# Patient Record
Sex: FEMALE | Race: WHITE | NOT HISPANIC OR LATINO | Employment: FULL TIME | URBAN - METROPOLITAN AREA
[De-identification: names, ages, dates, MRNs, and addresses within clinical notes are randomized per-mention and may not be internally consistent; named-entity substitution may affect disease eponyms.]

---

## 2022-02-02 ENCOUNTER — OFFICE VISIT (OUTPATIENT)
Dept: OBSTETRICS AND GYNECOLOGY | Facility: CLINIC | Age: 52
End: 2022-02-02

## 2022-02-02 VITALS
SYSTOLIC BLOOD PRESSURE: 130 MMHG | DIASTOLIC BLOOD PRESSURE: 82 MMHG | HEIGHT: 65 IN | WEIGHT: 169 LBS | BODY MASS INDEX: 28.16 KG/M2

## 2022-02-02 DIAGNOSIS — R93.89 THICKENED ENDOMETRIUM: Primary | ICD-10-CM

## 2022-02-02 PROCEDURE — 99203 OFFICE O/P NEW LOW 30 MIN: CPT | Performed by: OBSTETRICS & GYNECOLOGY

## 2022-02-02 PROCEDURE — 58100 BIOPSY OF UTERUS LINING: CPT | Performed by: OBSTETRICS & GYNECOLOGY

## 2022-02-02 RX ORDER — TRAZODONE HYDROCHLORIDE 50 MG/1
TABLET ORAL
COMMUNITY
Start: 2021-12-29

## 2022-02-02 RX ORDER — BUPROPION HYDROCHLORIDE 150 MG/1
150 TABLET ORAL DAILY
COMMUNITY
Start: 2021-12-29

## 2022-02-02 RX ORDER — SIMVASTATIN 10 MG
TABLET ORAL
COMMUNITY
Start: 2021-12-30

## 2022-02-02 NOTE — PROGRESS NOTES
"Chief Complaint   Patient presents with   • Vaginal Pain     NGYN; thickened EMC       Subjective   HPI  Ella Andrade is a 51 y.o. female, , who presents for evaluation of thickened EMC.   She was evaluated at PCP's office at  and an ultrasound revealed EMC on 22 of 1.3cm.      Her LMP was >1yr ago.  FSH on  was 60.7.  She denies vaginal bleeding but c/o a \"constant vaginal pressure.\"  She denies urinary incontinence, dysuria, and frequency.      Partner Status: Marital Status: single. She is not currently sexually active.      Additional OB/GYN History   Last Pap : approx. 2 yrs ago and was wnl per pt   Last Completed Pap Smear     This patient has no relevant Health Maintenance data.        History of abnormal Pap smear: no  Last mammogram: approx. 2 yrs ago and was wnl. Per pt   Last Completed Mammogram     This patient has no relevant Health Maintenance data.          Colonoscopy was nml in  at .    She has never had a BDS.             Tobacco Usage?: No   OB History        3    Para   3    Term   3            AB        Living   3       SAB        IAB        Ectopic        Molar        Multiple        Live Births   3                Health Maintenance   Topic Date Due   • Annual Gynecologic Pelvic and Breast Exam  Never done   • MAMMOGRAM  Never done   • ANNUAL PHYSICAL  Never done   • COVID-19 Vaccine (1) Never done   • ZOSTER VACCINE (1 of 2) Never done   • INFLUENZA VACCINE  2021   • HEPATITIS C SCREENING  Never done   • PAP SMEAR  Never done   • LIPID PANEL  2022   • TDAP/TD VACCINES (2 - Td or Tdap) 2028   • COLORECTAL CANCER SCREENING  2031   • Pneumococcal Vaccine 0-64  Aged Out       The additional following portions of the patient's history were reviewed and updated as appropriate: allergies, current medications, past family history, past medical history, past social history, past surgical history and problem list.    Endometrial " Biopsy    Ella Andrade is a 51 y.o. female,   , whose last menstrual period was No LMP recorded..  The patient has a history of thickened EMC on ultrasound and presents for an endometrial biopsy.  Patient denies vaginal bleeding. .  After the indications, risks, benefits, and alternatives to performing and endometrial biopsy were explained to the patient, and signed consent obtained.  A urine pregnancy test was not indicated.    PROCEDURE:  The patient was placed on the table in the supine lithotomy position.  She was draped in the appropriate manner.  A speculum was placed in the vagina.  The cervix was visualized and prepped with Betadine. A tenaculum was placed. A small plastic 5 mm Pipelle syringe curette was inserted into the cervical canal.  The uterus was sounded to 7 cms.  A vigorous four quadrant biopsy was performed, removing a small amount of tissue.  This tissue was placed in Formalin and sent to pathology.  The patient tolerated the procedure well and reported No cramping.  She had No cramping at the time of discharge.  Physical Exam    Procedures    Review of Systems      Plan:  Orders Placed This Encounter   Procedures   • Mammo Screening Digital Tomosynthesis Bilateral With CAD     Standing Status:   Future     Standing Expiration Date:   2023     Order Specific Question:   Reason for Exam:     Answer:   screen       Problem List Items Addressed This Visit     None      Visit Diagnoses     Thickened endometrium    -  Primary    Relevant Orders    Tissue Pathology Exam    Mammo Screening Digital Tomosynthesis Bilateral With CAD              Instructions  1. Call the office in 5 business days for biopsy results.  2. Patient instructed to call the office if develops a fever of 100.4 or greater, vaginal bleeding heavier than a period, foul vaginal discharge or pain.      Micaela Alcocer MD              Review of Systems   Constitutional: Negative.    HENT: Negative.    Eyes: Negative.   "  Respiratory: Negative.    Cardiovascular: Negative.    Gastrointestinal: Negative.    Endocrine: Positive for heat intolerance.   Genitourinary: Positive for vaginal pain.   Musculoskeletal: Negative.    Allergic/Immunologic: Negative.    Neurological: Negative.    Psychiatric/Behavioral: Positive for decreased concentration. The patient is nervous/anxious.        I have reviewed and agree with the HPI, ROS, and historical information as entered above. Micaela Alcocer MD    Objective   /82   Ht 165.1 cm (65\")   Wt 76.7 kg (169 lb)   Breastfeeding No   BMI 28.12 kg/m²       Assessment/Plan     Assessment     Problem List Items Addressed This Visit     None      Visit Diagnoses     Thickened endometrium    -  Primary    Relevant Orders    Tissue Pathology Exam    Mammo Screening Digital Tomosynthesis Bilateral With CAD          3. Postmenopausal, no vaginal bleeding, but her emc measured 1.3cm on US that was obtained for pelvic pressure. Endometrial biopsy done today. She is also due for annual exam, mammogram. Will call with pathology    Plan     Return in about 6 weeks (around 3/16/2022) for Annual physical.        Micaela Alcocer MD  02/02/2022  "

## 2022-02-07 DIAGNOSIS — R93.89 THICKENED ENDOMETRIUM: ICD-10-CM

## 2022-02-08 ENCOUNTER — TELEPHONE (OUTPATIENT)
Dept: OBSTETRICS AND GYNECOLOGY | Facility: CLINIC | Age: 52
End: 2022-02-08

## 2022-03-16 ENCOUNTER — OFFICE VISIT (OUTPATIENT)
Dept: OBSTETRICS AND GYNECOLOGY | Facility: CLINIC | Age: 52
End: 2022-03-16

## 2022-03-16 VITALS
SYSTOLIC BLOOD PRESSURE: 120 MMHG | BODY MASS INDEX: 27.82 KG/M2 | HEIGHT: 65 IN | DIASTOLIC BLOOD PRESSURE: 82 MMHG | WEIGHT: 167 LBS

## 2022-03-16 DIAGNOSIS — Z01.419 WELL WOMAN EXAM WITH ROUTINE GYNECOLOGICAL EXAM: Primary | ICD-10-CM

## 2022-03-16 PROCEDURE — 99396 PREV VISIT EST AGE 40-64: CPT | Performed by: OBSTETRICS & GYNECOLOGY

## 2022-03-16 NOTE — PROGRESS NOTES
GYN Annual Exam     CC - Here for annual exam.     Subjective   HPI  Ella Andrade is a 51 y.o. female, , who presents for annual well woman exam.  She is postmenopausal. She is not on hormone replacement therapy. Partner Status: Marital Status: single.  New Partners since last visit: no Desires STD Screening: no    She feels like things that wouldn't upset or agitate her in the past do now and she is more irritable.     Last mammogram: 2-3 yrs ago; wnl per pt   Last Completed Mammogram     This patient has no relevant Health Maintenance data.        Last colonoscopy: nml per pt   Last Completed Colonoscopy          COLORECTAL CANCER SCREENING (COLONOSCOPY - Every 10 Years) Next due on 2021  COLONOSCOPY (Done - nml at )              Last DEXA: never   Last Pap : approximately 2 years ago and was wnl per pt   Last Completed Pap Smear     This patient has no relevant Health Maintenance data.        History of abnormal Pap smear: no    Additional OB/GYN History   Family history of uterine, colon, breast, or ovarian cancer: yes - father had colon cancer  Performs monthly Self-Breast Exam: yes - .  Exercises Regularly: no  Feelings of Anxiety or Depression: yes - on Wellbutrin but feeling more irritable and agitated     Tobacco Usage?: No   OB History        3    Para   3    Term   3            AB        Living   3       SAB        IAB        Ectopic        Molar        Multiple        Live Births   3                Health Maintenance   Topic Date Due   • Annual Gynecologic Pelvic and Breast Exam  Never done   • MAMMOGRAM  Never done   • ANNUAL PHYSICAL  Never done   • COVID-19 Vaccine (1) Never done   • ZOSTER VACCINE (1 of 2) Never done   • INFLUENZA VACCINE  2021   • HEPATITIS C SCREENING  Never done   • PAP SMEAR  Never done   • LIPID PANEL  2022   • TDAP/TD VACCINES (2 - Td or Tdap) 2028   • COLORECTAL CANCER SCREENING  2031   • Pneumococcal  "Vaccine 0-64  Aged Out       The additional following portions of the patient's history were reviewed and updated as appropriate: allergies, current medications, past family history, past medical history, past social history, past surgical history and problem list.    Review of Systems   Constitutional: Negative.    HENT: Negative.    Respiratory: Negative.    Cardiovascular: Negative.    Gastrointestinal: Negative.    Endocrine: Positive for heat intolerance.   Genitourinary: Negative.    Musculoskeletal: Negative.    Skin: Negative.    Allergic/Immunologic: Negative.    Neurological: Negative.    Hematological: Negative.    Psychiatric/Behavioral: Positive for sleep disturbance. The patient is nervous/anxious.        I have reviewed and agree with the HPI, ROS, and historical information as entered above. Micaela Alcocer MD    Objective   /82   Ht 165.1 cm (65\")   Wt 75.8 kg (167 lb)   Breastfeeding No   BMI 27.79 kg/m²     Physical Exam  Vitals and nursing note reviewed. Exam conducted with a chaperone present.   Constitutional:       Appearance: She is well-developed.   HENT:      Head: Normocephalic and atraumatic.   Eyes:      Pupils: Pupils are equal, round, and reactive to light.   Neck:      Thyroid: No thyroid mass or thyromegaly.   Pulmonary:      Effort: Pulmonary effort is normal. No retractions.   Chest:      Chest wall: No mass.   Breasts:      Right: Normal. No mass, nipple discharge, skin change or tenderness.      Left: Normal. No mass, nipple discharge, skin change or tenderness.       Abdominal:      General: Bowel sounds are normal.      Palpations: Abdomen is soft. Abdomen is not rigid. There is no mass.      Tenderness: There is no abdominal tenderness. There is no guarding.      Hernia: No hernia is present. There is no hernia in the left inguinal area or right inguinal area.   Genitourinary:     Exam position: Lithotomy position.      Pubic Area: No rash.       Labia:         Right: " No rash, tenderness or lesion.         Left: No rash, tenderness or lesion.       Urethra: No urethral pain or urethral swelling.      Vagina: Normal. No vaginal discharge or lesions.      Cervix: No cervical motion tenderness, discharge, lesion or cervical bleeding.      Uterus: Normal. Not enlarged, not fixed and not tender.       Adnexa:         Right: No mass, tenderness or fullness.          Left: No mass, tenderness or fullness.        Rectum: No external hemorrhoid.   Musculoskeletal:      Cervical back: Normal range of motion. No muscular tenderness.      Right lower leg: No edema.      Left lower leg: No edema.   Skin:     General: Skin is warm and dry.   Neurological:      Mental Status: She is alert and oriented to person, place, and time.      Motor: Motor function is intact.   Psychiatric:         Mood and Affect: Mood and affect normal.         Behavior: Behavior normal.     right labial irritation, reports had biopsy in past and is psoriasis      Assessment/Plan     Encounter Diagnosis   Name Primary?   • Well woman exam with routine gynecological exam Yes       Recommended use of Vitamin D replacement and getting adequate calcium in her diet. (1500mg).     Reviewed HPV guidelines  Reviewed monthly self breast exams.  Instructed to call with lumps, pain, or breast discharge.  Yearly mammograms ordered.  RTC in 1 year or PRN with problems   sched dexa. Has mammogram sched this month.     Micaela Alcocer MD   03/16/2022

## 2022-03-22 ENCOUNTER — APPOINTMENT (OUTPATIENT)
Dept: OTHER | Facility: HOSPITAL | Age: 52
End: 2022-03-22

## 2022-03-22 ENCOUNTER — HOSPITAL ENCOUNTER (OUTPATIENT)
Dept: MAMMOGRAPHY | Facility: HOSPITAL | Age: 52
Discharge: HOME | End: 2022-03-22
Admitting: OBSTETRICS & GYNECOLOGY

## 2022-03-22 DIAGNOSIS — R93.89 THICKENED ENDOMETRIUM: ICD-10-CM

## 2022-03-22 PROCEDURE — 77067 SCR MAMMO BI INCL CAD: CPT

## 2022-03-22 PROCEDURE — 77063 BREAST TOMOSYNTHESIS BI: CPT

## 2022-03-22 PROCEDURE — 77067 SCR MAMMO BI INCL CAD: CPT | Performed by: RADIOLOGY

## 2022-03-22 PROCEDURE — 77063 BREAST TOMOSYNTHESIS BI: CPT | Performed by: RADIOLOGY

## 2022-03-24 DIAGNOSIS — Z01.419 WELL WOMAN EXAM WITH ROUTINE GYNECOLOGICAL EXAM: ICD-10-CM

## 2022-05-10 ENCOUNTER — HOSPITAL ENCOUNTER (OUTPATIENT)
Dept: MAMMOGRAPHY | Facility: HOSPITAL | Age: 52
Discharge: HOME | End: 2022-05-10
Admitting: RADIOLOGY

## 2022-05-10 DIAGNOSIS — R92.8 ABNORMAL MAMMOGRAM: ICD-10-CM

## 2022-05-10 PROCEDURE — G0279 TOMOSYNTHESIS, MAMMO: HCPCS

## 2022-05-10 PROCEDURE — 77065 DX MAMMO INCL CAD UNI: CPT

## 2022-05-10 PROCEDURE — 77061 BREAST TOMOSYNTHESIS UNI: CPT | Performed by: RADIOLOGY

## 2022-05-10 PROCEDURE — 77065 DX MAMMO INCL CAD UNI: CPT | Performed by: RADIOLOGY

## 2024-04-16 ENCOUNTER — TRANSCRIBE ORDERS (OUTPATIENT)
Dept: ADMINISTRATIVE | Facility: HOSPITAL | Age: 54
End: 2024-04-16
Payer: COMMERCIAL

## 2024-04-16 DIAGNOSIS — Z12.31 VISIT FOR SCREENING MAMMOGRAM: Primary | ICD-10-CM

## 2024-05-10 ENCOUNTER — HOSPITAL ENCOUNTER (OUTPATIENT)
Dept: MAMMOGRAPHY | Facility: HOSPITAL | Age: 54
Discharge: HOME | End: 2024-05-10
Admitting: FAMILY MEDICINE
Payer: COMMERCIAL

## 2024-05-10 DIAGNOSIS — Z12.31 VISIT FOR SCREENING MAMMOGRAM: ICD-10-CM

## 2024-05-10 PROCEDURE — 77067 SCR MAMMO BI INCL CAD: CPT

## 2024-05-10 PROCEDURE — 77063 BREAST TOMOSYNTHESIS BI: CPT

## 2025-04-22 ENCOUNTER — TRANSCRIBE ORDERS (OUTPATIENT)
Dept: ADMINISTRATIVE | Facility: HOSPITAL | Age: 55
End: 2025-04-22
Payer: COMMERCIAL

## 2025-04-22 DIAGNOSIS — R13.10 DYSPHAGIA, UNSPECIFIED TYPE: Primary | ICD-10-CM

## 2025-04-22 DIAGNOSIS — J02.9 ACUTE PHARYNGITIS, UNSPECIFIED ETIOLOGY: ICD-10-CM

## 2025-04-25 ENCOUNTER — HOSPITAL ENCOUNTER (OUTPATIENT)
Dept: CT IMAGING | Facility: HOSPITAL | Age: 55
Discharge: HOME | End: 2025-04-25
Admitting: FAMILY MEDICINE
Payer: COMMERCIAL

## 2025-04-25 DIAGNOSIS — J02.9 ACUTE PHARYNGITIS, UNSPECIFIED ETIOLOGY: ICD-10-CM

## 2025-04-25 DIAGNOSIS — R13.10 DYSPHAGIA, UNSPECIFIED TYPE: ICD-10-CM

## 2025-04-25 PROCEDURE — 25510000001 IOPAMIDOL 61 % SOLUTION: Performed by: FAMILY MEDICINE

## 2025-04-25 PROCEDURE — 70491 CT SOFT TISSUE NECK W/DYE: CPT

## 2025-04-25 RX ORDER — IOPAMIDOL 612 MG/ML
100 INJECTION, SOLUTION INTRAVASCULAR
Status: COMPLETED | OUTPATIENT
Start: 2025-04-25 | End: 2025-04-25

## 2025-04-25 RX ORDER — IOPAMIDOL 612 MG/ML
100 INJECTION, SOLUTION INTRAVASCULAR
Status: DISCONTINUED | OUTPATIENT
Start: 2025-04-25 | End: 2025-04-25

## 2025-04-25 RX ADMIN — IOPAMIDOL 90 ML: 612 INJECTION, SOLUTION INTRAVENOUS at 09:39
